# Patient Record
Sex: MALE | Race: WHITE | Employment: FULL TIME | URBAN - METROPOLITAN AREA
[De-identification: names, ages, dates, MRNs, and addresses within clinical notes are randomized per-mention and may not be internally consistent; named-entity substitution may affect disease eponyms.]

---

## 2018-07-12 ENCOUNTER — HOSPITAL ENCOUNTER (OUTPATIENT)
Dept: PHYSICAL THERAPY | Age: 58
Setting detail: THERAPIES SERIES
Discharge: HOME | End: 2018-07-12
Payer: COMMERCIAL

## 2018-07-12 PROCEDURE — G0283 ELEC STIM OTHER THAN WOUND: HCPCS

## 2018-07-12 PROCEDURE — 97162 PT EVAL MOD COMPLEX 30 MIN: CPT

## 2018-07-12 PROCEDURE — 97110 THERAPEUTIC EXERCISES: CPT

## 2018-07-12 ASSESSMENT — PAIN DESCRIPTION - PAIN TYPE: TYPE: CHRONIC PAIN

## 2018-07-12 ASSESSMENT — PAIN DESCRIPTION - PROGRESSION: CLINICAL_PROGRESSION: GRADUALLY IMPROVING

## 2018-07-12 ASSESSMENT — PAIN DESCRIPTION - ORIENTATION: ORIENTATION: RIGHT

## 2018-07-12 ASSESSMENT — PAIN DESCRIPTION - LOCATION: LOCATION: SHOULDER

## 2018-07-12 ASSESSMENT — PAIN SCALES - GENERAL: PAINLEVEL_OUTOF10: 4

## 2018-07-12 ASSESSMENT — PAIN DESCRIPTION - FREQUENCY: FREQUENCY: CONTINUOUS

## 2018-07-12 NOTE — PROGRESS NOTES
sig tightness   Observation: prominent C6-7, T1 spinous processes, sig atrophy Rt deltoid, biceps: 10 cm from antecubital fossa 24.5; Lt 26     Strength RUE  Strength RUE: Exception  R Shoulder Flexion: 3-/5  R Shoulder Extension: 3-/5  R Shoulder ABduction: 2/5  R Shoulder Internal Rotation: 3-/5  R Shoulder External Rotation: 3-/5  R Elbow Flexion: 4+/5  R Elbow Extension: 4-/5  R Forearm Pron: 4+/5  R Forearm Sup: 4+/5  R Wrist Flexion: 4+/5  R Wrist Extension: 4+/5  Strength LUE  Comment: 4+ to 5/5   Strength Other  Other: grasp WFL, dexterity WFL               PROM RUE (degrees)  RUE PROM: Exceptions  R Shoulder Flex  0-180: 110  R Shoulder ABduction 0-180: 74  R Shoulder Int Rotation  0-70: 35  R Shoulder Ext Rotation  0-90: 41  AROM RUE (degrees)  RUE AROM : Exceptions  R Shoulder Flexion 0-180: 101  R Shoulder ABduction 0-180: 70  R Shoulder Int Rotation  0-70: 30  R Shoulder Ext Rotation 0-90: 34         Sensation  Overall Sensation Status: Impaired (c/o diminished sensation Rt deltoid area )   Bed mobility  Rolling to Left: Independent  Rolling to Right: Independent  Supine to Sit: Independent  Sit to Supine: Independent     Transfers  Sit to Stand: Independent  Stand to sit: Independent     Exercises:   Exercises  Exercise 1: shldr shrugs and retraction x10 seated to improve shldr positioning   Exercise 2: pulleys 3 minutes flexion within pt tolerance to improve ROM   Exercise 3: table slides flexion only 20s/5 to improve ROM   Exercise 4: ** chin tucks   Exercise 5: ** pec str supine   Exercise 6: ** prone scap when able   Exercise 7: ** cervical isometrics   Exercise 8: ** wand exs   Exercise 9: ** pendulum if needed   Exercise 20: HEP: posture exs, table slides flexion     Manual:  Manual therapy  Joint mobilization: ** gentle Gr I, II inferior and posterior to pt tolerance only!    PROM: ** shldr all planes   Manual traction: ** cervical   Soft Tissue Mobalization: ** suboccip release, MFR, STM/ DTM to

## 2018-07-12 NOTE — PLAN OF CARE
[facility]   Kerri Crowder. [address], [place]  Phone:  [phone]   Fax:  [phone]    [] Certification  [] Recertification [x]  Plan of Care  [] Progress Note   [] Discharge        To:  Referring Practitioner: Dr. Daley Center   From:  [facility]  Patient: Deepa Bowers          : 1960  Diagnosis: cervical fracture, separation of AC joint       Date: 2018  Treatment Diagnosis: Rt shldr pain, Rt UE weakness, decreased Rt UE ROM, impaired ADLs        Progress Report Period from:   to 2018    Total # of Visits to Date: 1   No Show: 0    Canceled Appointment: 0     OBJECTIVE:   Short Term Goals - Time Frame for Short term goals: 2 wks     Goals Current/Discharge status  Met   Short term goal 1: Independent with HEP   Need for HEP, currently performing isometrics and elbow strengthening with Tband  [] yes  [] no   Short term goal 2: Report 25% reduction in symptoms with increased tolerance to ADLs   C/o pain 1-9/10, currently 4/10 at rest.  Difficulty with dressing, bathing, and most ADLs  [] yes  [] no   Short term goal 3: Improve ROM 10-15 degrees Rt shldr to allow increased ease with dressing    PROM RUE (degrees)  RUE PROM: Exceptions  R Shoulder Flex  0-180: 110  R Shoulder ABduction 0-180: 74  R Shoulder Int Rotation  0-70: 35  R Shoulder Ext Rotation  0-90: 41  AROM RUE (degrees)  RUE AROM : Exceptions  R Shoulder Flexion 0-180: 101  R Shoulder ABduction 0-180: 70  R Shoulder Int Rotation  0-70: 30  R Shoulder Ext Rotation 0-90: 34    [] yes  [] no   Short term goal 4: Improved postural awareness with decreased scapular winging to allow increased Rt shldr ROM for all ADLs   Sig rounded shoulders with Rt scapular elevation and winging impacting functional shoulder mobility  [] yes  [] no     Long Term Goals - Time Frame for Long term goals : 6-8 wks   Goals Current/ Discharge status Met   Long term goal 1: Improve Rt shldr ROM

## 2018-07-17 ENCOUNTER — HOSPITAL ENCOUNTER (OUTPATIENT)
Dept: PHYSICAL THERAPY | Age: 58
Setting detail: THERAPIES SERIES
Discharge: HOME | End: 2018-07-17
Payer: COMMERCIAL

## 2018-07-17 PROCEDURE — 97110 THERAPEUTIC EXERCISES: CPT

## 2018-07-17 PROCEDURE — G0283 ELEC STIM OTHER THAN WOUND: HCPCS

## 2018-07-17 PROCEDURE — 97140 MANUAL THERAPY 1/> REGIONS: CPT

## 2018-07-17 ASSESSMENT — PAIN DESCRIPTION - DESCRIPTORS: DESCRIPTORS: ACHING;TIGHTNESS

## 2018-07-17 ASSESSMENT — PAIN DESCRIPTION - PAIN TYPE: TYPE: CHRONIC PAIN

## 2018-07-17 ASSESSMENT — PAIN DESCRIPTION - LOCATION: LOCATION: SHOULDER

## 2018-07-17 ASSESSMENT — PAIN DESCRIPTION - ORIENTATION: ORIENTATION: RIGHT

## 2018-07-17 ASSESSMENT — PAIN SCALES - GENERAL: PAINLEVEL_OUTOF10: 2

## 2018-07-17 NOTE — PROGRESS NOTES
Physician  [x] Perform HEP  [] Meds as prescribed     ASSESSMENT:     Conditions Requiring Skilled Therapeutic Intervention  Assessment: Pt with high guard with shoulder. Pt demostrates decreased ROM in all planes     Tolerance to treatment:  [] Good   [x] Fair   [] Poor  [] Fatigued   [] Increased pain   Limited by:    Goals:     Short term goals  Time Frame for Short term goals: 2 wks   Short term goal 1: Independent with HEP   Short term goal 2: Report 25% reduction in symptoms with increased tolerance to ADLs   Short term goal 3: Improve ROM 10-15 degrees Rt shldr to allow increased ease with dressing   Short term goal 4: Improved postural awareness with decreased scapular winging to allow increased Rt shldr ROM for all ADLs   Long term goals  Time Frame for Long term goals : 6-8 wks   Long term goal 1: Improve Rt shldr ROM flexion and abduction >/= 150 to allow increased ease with fishing and overhead reaching   Long term goal 2: Improve Rt shldr strength >/= 4/5 to allow carrying objects and work duties   Long term goal 3: UEFI >/= 50/80 to demonstrate improved overall activity tolerance     Progress toward goals: TBD  Goals Met:    []  See updated POC   Comments:    PLAN:  [x] Continue POC to pt tolerance  [] Hold PT for ___ days.   See note to physician  [] Discharge PT    Signature:   Electronically signed by Tilda Gottron, PTA on 7/17/18 at 12:01 PM    PT Individual Minutes  Time In: 1000  Time Out: 1100  Minutes: 60  Timed Code Treatment Minutes: 45 Minutes

## 2018-07-18 ENCOUNTER — HOSPITAL ENCOUNTER (OUTPATIENT)
Dept: PHYSICAL THERAPY | Age: 58
Setting detail: THERAPIES SERIES
Discharge: HOME | End: 2018-07-18
Payer: COMMERCIAL

## 2018-07-18 PROCEDURE — 97140 MANUAL THERAPY 1/> REGIONS: CPT

## 2018-07-18 PROCEDURE — 97110 THERAPEUTIC EXERCISES: CPT

## 2018-07-18 PROCEDURE — G0283 ELEC STIM OTHER THAN WOUND: HCPCS

## 2018-07-18 ASSESSMENT — PAIN DESCRIPTION - LOCATION: LOCATION: SHOULDER

## 2018-07-18 ASSESSMENT — PAIN DESCRIPTION - DESCRIPTORS: DESCRIPTORS: ACHING

## 2018-07-18 ASSESSMENT — PAIN SCALES - GENERAL: PAINLEVEL_OUTOF10: 3

## 2018-07-18 ASSESSMENT — PAIN DESCRIPTION - ORIENTATION: ORIENTATION: RIGHT

## 2018-07-18 NOTE — PROGRESS NOTES
[facility]   Outpatient Physical Therapy   Treatment Note  [] [address]  [x] [facility]  Date: 2018  Patient: Claudine Rossi  : 1960  ACCT #: [de-identified]  Referring Practitioner: Dr. Terrance Beck   Diagnosis: cervical fracture, separation of [facility] joint     Visit Information:  PT Visit Information  Onset Date: 10/13/18  PT Insurance Information: [employer]   Total # of Visits Approved: 30  Total # of Visits to Date: 3  No Show: 0  Canceled Appointment: 0  Progress Note Counter: 3/16    SUBJECTIVE:   Subjective  Subjective: Reports feeling \"pretty sore\" today. HEP Compliance:  [x] Good [] Fair [] Poor [] Reports not doing due to:    PAIN   Location:   Pain Location: Shoulder (clavicle)  Pain Rating (0-10 pain scale):  Pain Level: 3  Pain Description:  Pain Descriptors: Aching  Action:  [x] Acceptable for treatment  []  Other:    OBJECTIVE:   Exercises  Exercise 1: shldr shrugs and retraction x10 seated to improve shldr positioning   Exercise 2: pulleys 3 minutes flexion within pt tolerance to improve ROM   Exercise 9: pendulums  Exercise 20: HEP: cont pendulums to decrease overall muscle tension    Manual: []  NA   Manual therapy  Soft Tissue Mobalization: seated MFR/STM cervical, sub-scap, shdlr musculature to decrease spasms and pain    Modalities: []  NA  Modalities  E-stim (parameters): IFC/HP seated x 15min to decrease pain and spasms     HEP  Continue with current Home Exercise Program.  See Objective section for progression of HEP. Comments:       POST-PAIN    Pain Rating (0-10 pain scale): 2/10  Location and Pain Description same as pre-pain unless otherwise indicated. Action: [] NA  [] Call Physician  [x] Perform HEP  [] Meds as prescribed     ASSESSMENT:     Conditions Requiring Skilled Therapeutic Intervention  Assessment: Limited to tolerance to exercises due to spasms and pt reports of \"locking up. \"

## 2018-07-23 ENCOUNTER — APPOINTMENT (OUTPATIENT)
Dept: PHYSICAL THERAPY | Age: 58
End: 2018-07-23
Payer: COMMERCIAL

## 2018-07-24 ENCOUNTER — HOSPITAL ENCOUNTER (OUTPATIENT)
Dept: PHYSICAL THERAPY | Age: 58
Setting detail: THERAPIES SERIES
Discharge: HOME | End: 2018-07-24
Payer: COMMERCIAL

## 2018-07-24 PROCEDURE — 97140 MANUAL THERAPY 1/> REGIONS: CPT

## 2018-07-24 PROCEDURE — 97110 THERAPEUTIC EXERCISES: CPT

## 2018-07-24 PROCEDURE — G0283 ELEC STIM OTHER THAN WOUND: HCPCS

## 2018-07-24 ASSESSMENT — PAIN SCALES - GENERAL: PAINLEVEL_OUTOF10: 2

## 2018-07-24 ASSESSMENT — PAIN DESCRIPTION - ORIENTATION: ORIENTATION: RIGHT

## 2018-07-24 ASSESSMENT — PAIN DESCRIPTION - LOCATION: LOCATION: SHOULDER

## 2018-07-24 ASSESSMENT — PAIN DESCRIPTION - DESCRIPTORS: DESCRIPTORS: ACHING

## 2018-07-24 ASSESSMENT — PAIN DESCRIPTION - PAIN TYPE: TYPE: CHRONIC PAIN

## 2018-07-25 ENCOUNTER — APPOINTMENT (OUTPATIENT)
Dept: PHYSICAL THERAPY | Age: 58
End: 2018-07-25
Payer: COMMERCIAL

## 2018-07-26 ENCOUNTER — APPOINTMENT (OUTPATIENT)
Dept: PHYSICAL THERAPY | Age: 58
End: 2018-07-26
Payer: COMMERCIAL

## 2018-07-30 ENCOUNTER — HOSPITAL ENCOUNTER (OUTPATIENT)
Dept: PHYSICAL THERAPY | Age: 58
Setting detail: THERAPIES SERIES
Discharge: HOME | End: 2018-07-30
Payer: COMMERCIAL

## 2018-07-30 PROCEDURE — 97110 THERAPEUTIC EXERCISES: CPT

## 2018-07-30 PROCEDURE — 97140 MANUAL THERAPY 1/> REGIONS: CPT

## 2018-07-30 PROCEDURE — G0283 ELEC STIM OTHER THAN WOUND: HCPCS

## 2018-07-30 ASSESSMENT — PAIN SCALES - GENERAL: PAINLEVEL_OUTOF10: 2

## 2018-07-30 ASSESSMENT — PAIN DESCRIPTION - PAIN TYPE: TYPE: CHRONIC PAIN

## 2018-07-30 ASSESSMENT — PAIN DESCRIPTION - ORIENTATION: ORIENTATION: RIGHT

## 2018-07-30 ASSESSMENT — PAIN DESCRIPTION - LOCATION: LOCATION: SHOULDER

## 2018-07-30 ASSESSMENT — PAIN DESCRIPTION - DESCRIPTORS: DESCRIPTORS: ACHING

## 2018-07-30 NOTE — PROGRESS NOTES
POST-PAIN    Pain Rating (0-10 pain scale): 3/10  Location and Pain Description same as pre-pain unless otherwise indicated. Action: [] NA  [] Call Physician  [x] Perform HEP  [] Meds as prescribed     ASSESSMENT:     Conditions Requiring Skilled Therapeutic Intervention  Assessment: pt with increased pain with rom and abduction. Pt demonstrated increased prom. Tolerance to treatment:  [x] Good   [] Fair   [] Poor  [] Fatigued   [] Increased pain   Limited by:    Goals:     Short term goals  Time Frame for Short term goals: 2 wks   Short term goal 1: Independent with HEP   Short term goal 2: Report 25% reduction in symptoms with increased tolerance to ADLs   Short term goal 3: Improve ROM 10-15 degrees Rt shldr to allow increased ease with dressing   Short term goal 4: Improved postural awareness with decreased scapular winging to allow increased Rt shldr ROM for all ADLs   Long term goals  Time Frame for Long term goals : 6-8 wks   Long term goal 1: Improve Rt shldr ROM flexion and abduction >/= 150 to allow increased ease with fishing and overhead reaching   Long term goal 2: Improve Rt shldr strength >/= 4/5 to allow carrying objects and work duties   Long term goal 3: UEFI >/= 50/80 to demonstrate improved overall activity tolerance     Progress toward goals:  rom  Goals Met:    []  See updated POC   Comments:    PLAN:  [x] Continue POC to pt tolerance  [] Hold PT for ___ days.   See note to physician  [] Discharge PT    Signature:   Electronically signed by Tanja Gross PTA on 7/30/18 at 3:20 PM    PT Individual Minutes  Time In: 1300  Time Out: 1400  Minutes: 60  Timed Code Treatment Minutes: 50 Minutes

## 2018-08-01 ENCOUNTER — HOSPITAL ENCOUNTER (OUTPATIENT)
Dept: PHYSICAL THERAPY | Age: 58
Setting detail: THERAPIES SERIES
Discharge: HOME | End: 2018-08-01
Payer: COMMERCIAL

## 2018-08-01 PROCEDURE — 97140 MANUAL THERAPY 1/> REGIONS: CPT

## 2018-08-01 PROCEDURE — G0283 ELEC STIM OTHER THAN WOUND: HCPCS

## 2018-08-01 PROCEDURE — 97110 THERAPEUTIC EXERCISES: CPT

## 2018-08-01 ASSESSMENT — PAIN DESCRIPTION - DESCRIPTORS: DESCRIPTORS: ACHING

## 2018-08-01 ASSESSMENT — PAIN SCALES - GENERAL: PAINLEVEL_OUTOF10: 3

## 2018-08-01 ASSESSMENT — PAIN DESCRIPTION - ORIENTATION: ORIENTATION: RIGHT

## 2018-08-01 ASSESSMENT — PAIN DESCRIPTION - LOCATION: LOCATION: SHOULDER

## 2018-08-01 NOTE — PROGRESS NOTES
Perform HEP  [] Meds as prescribed     ASSESSMENT:     Conditions Requiring Skilled Therapeutic Intervention  Assessment: Large muscle spasms throught scap musculature which pt reports \"locked up\" during exercises and reported unable to move. Utlized heat during prone arm hang for increase elasticity and tolerance to treatment. Pt with good release and increase scap mobility. Patient Education: wear and removal protocol with KT     Tolerance to treatment:  [x] Good   [] Fair   [] Poor  [] Fatigued   [] Increased pain   Limited by:    Goals:     Short term goals  Time Frame for Short term goals: 2 wks   Short term goal 1: Independent with HEP   Short term goal 2: Report 25% reduction in symptoms with increased tolerance to ADLs   Short term goal 3: Improve ROM 10-15 degrees Rt shldr to allow increased ease with dressing   Short term goal 4: Improved postural awareness with decreased scapular winging to allow increased Rt shldr ROM for all ADLs   Long term goals  Time Frame for Long term goals : 6-8 wks   Long term goal 1: Improve Rt shldr ROM flexion and abduction >/= 150 to allow increased ease with fishing and overhead reaching   Long term goal 2: Improve Rt shldr strength >/= 4/5 to allow carrying objects and work duties   Long term goal 3: UEFI >/= 50/80 to demonstrate improved overall activity tolerance     Progress toward goals: good pain reduction and improved scapular positioning with treatment  Goals Met:    []  See updated POC   Comments:    PLAN:  [x] Continue POC to pt tolerance  [] Hold PT for ___ days.   See note to physician  [] Discharge PT    Signature:   Electronically signed by Dez Mcbride PTA on 8/1/18 at 10:09 AM    PT Individual Minutes  Time In: 1000  Time Out: 1100  Minutes: 60  Timed Code Treatment Minutes: 45 Minutes

## 2018-08-06 ENCOUNTER — HOSPITAL ENCOUNTER (OUTPATIENT)
Dept: PHYSICAL THERAPY | Age: 58
Setting detail: THERAPIES SERIES
Discharge: HOME | End: 2018-08-06
Payer: COMMERCIAL

## 2018-08-06 PROCEDURE — 97140 MANUAL THERAPY 1/> REGIONS: CPT

## 2018-08-06 PROCEDURE — G0283 ELEC STIM OTHER THAN WOUND: HCPCS

## 2018-08-06 PROCEDURE — 97110 THERAPEUTIC EXERCISES: CPT

## 2018-08-06 ASSESSMENT — PAIN DESCRIPTION - LOCATION: LOCATION: SHOULDER

## 2018-08-06 ASSESSMENT — PAIN DESCRIPTION - ORIENTATION: ORIENTATION: RIGHT

## 2018-08-06 ASSESSMENT — PAIN DESCRIPTION - PROGRESSION: CLINICAL_PROGRESSION: GRADUALLY IMPROVING

## 2018-08-06 ASSESSMENT — PAIN DESCRIPTION - FREQUENCY: FREQUENCY: CONTINUOUS

## 2018-08-06 ASSESSMENT — PAIN SCALES - GENERAL: PAINLEVEL_OUTOF10: 3

## 2018-08-06 ASSESSMENT — PAIN DESCRIPTION - DESCRIPTORS: DESCRIPTORS: ACHING;SPASM

## 2018-08-06 NOTE — PROGRESS NOTES
unless otherwise indicated. Action: [] NA  [] Call Physician  [x] Perform HEP  [] Meds as prescribed     ASSESSMENT:     Conditions Requiring Skilled Therapeutic Intervention  Assessment: Crepitus noted in left scap and shoulder with palpation. Muscle spasms noted with end range with all movements     Tolerance to treatment:  [x] Good   [] Fair   [] Poor  [] Fatigued   [] Increased pain   Limited by:    Goals:     Short term goals  Time Frame for Short term goals: 2 wks   Short term goal 1: Independent with HEP   Short term goal 2: Report 25% reduction in symptoms with increased tolerance to ADLs   Short term goal 3: Improve ROM 10-15 degrees Rt shldr to allow increased ease with dressing   Short term goal 4: Improved postural awareness with decreased scapular winging to allow increased Rt shldr ROM for all ADLs   Long term goals  Time Frame for Long term goals : 6-8 wks   Long term goal 1: Improve Rt shldr ROM flexion and abduction >/= 150 to allow increased ease with fishing and overhead reaching   Long term goal 2: Improve Rt shldr strength >/= 4/5 to allow carrying objects and work duties   Long term goal 3: UEFI >/= 50/80 to demonstrate improved overall activity tolerance     Progress toward goals:  Goals Met:    []  See updated POC   Comments:    PLAN:  [x] Continue POC to pt tolerance  [] Hold PT for ___ days.   See note to physician  [] Discharge PT    Signature:   Electronically signed by Renny Molina PTA on 8/6/18 at 10:07 AM    PT Individual Minutes  Time In: 1000  Time Out: 1100  Minutes: 60   Activity Minutes Units   Ther Ex 25 2   Manual   20  1   Neuro Ed     EStim 15 1       Timed Code Treatment Minutes: 45 Minutes

## 2018-08-14 ENCOUNTER — HOSPITAL ENCOUNTER (OUTPATIENT)
Dept: PHYSICAL THERAPY | Age: 58
Setting detail: THERAPIES SERIES
Discharge: HOME | End: 2018-08-14
Payer: COMMERCIAL

## 2018-08-14 PROCEDURE — 97140 MANUAL THERAPY 1/> REGIONS: CPT

## 2018-08-14 PROCEDURE — 97110 THERAPEUTIC EXERCISES: CPT

## 2018-08-14 PROCEDURE — 97035 APP MDLTY 1+ULTRASOUND EA 15: CPT

## 2018-08-14 ASSESSMENT — PAIN DESCRIPTION - LOCATION: LOCATION: SHOULDER

## 2018-08-14 ASSESSMENT — PAIN DESCRIPTION - DESCRIPTORS: DESCRIPTORS: ACHING

## 2018-08-14 ASSESSMENT — PAIN SCALES - GENERAL: PAINLEVEL_OUTOF10: 3

## 2018-08-14 ASSESSMENT — PAIN DESCRIPTION - ORIENTATION: ORIENTATION: RIGHT

## 2018-08-14 NOTE — PROGRESS NOTES
otherwise indicated. Action: [] NA  [] Call Physician  [x] Perform HEP  [] Meds as prescribed     ASSESSMENT:     Conditions Requiring Skilled Therapeutic Intervention  Assessment: Pt with increasing chong to treatment. Pt stated  us was uncomfortable when applied to anterior shoulder but felt ok on deltoid insertion, may decrease intensity nv. Tolerance to treatment:  [x] Good   [] Fair   [] Poor  [] Fatigued   [] Increased pain   Limited by:    Goals:     Short term goals  Time Frame for Short term goals: 2 wks   Short term goal 1: Independent with HEP   Short term goal 2: Report 25% reduction in symptoms with increased tolerance to ADLs   Short term goal 3: Improve ROM 10-15 degrees Rt shldr to allow increased ease with dressing   Short term goal 4: Improved postural awareness with decreased scapular winging to allow increased Rt shldr ROM for all ADLs   Long term goals  Time Frame for Long term goals : 6-8 wks   Long term goal 1: Improve Rt shldr ROM flexion and abduction >/= 150 to allow increased ease with fishing and overhead reaching   Long term goal 2: Improve Rt shldr strength >/= 4/5 to allow carrying objects and work duties   Long term goal 3: UEFI >/= 50/80 to demonstrate improved overall activity tolerance     Progress toward goals: slow progress due to pain   Goals Met:    []  See updated POC   Comments:    PLAN:  [x] Continue POC to pt tolerance  [] Hold PT for ___ days.   See note to physician  [] Discharge PT    Signature:   Electronically signed by Cassidy Anthony PTA on 8/14/18 at 10:25 AM    PT Individual Minutes  Time In: 0900  Time Out: 4393  Minutes: 45  Timed Code Treatment Minutes: 45 Minutes

## 2018-08-21 ENCOUNTER — HOSPITAL ENCOUNTER (OUTPATIENT)
Dept: PHYSICAL THERAPY | Age: 58
Setting detail: THERAPIES SERIES
Discharge: HOME | End: 2018-08-21
Payer: COMMERCIAL

## 2018-08-21 NOTE — PROGRESS NOTES
[address] of [address] [employer]      Physical Therapy  Cancellation/No-show Note          Patient Name:  Makayla Cavazos  :  1960   Date:  2018  Referring Practitioner: Dr. David Oedn   Diagnosis: cervical fracture, separation of [facility] joint     Visit Information:  PT Visit Information  Onset Date: 10/13/18  [facility] Information[facility]   Total # of Visits Approved: 30  No Show: 1  Canceled Appointment: 1  Progress Note Counter: nc/ns    For today's appointment patient:  []  Cancelled  []  Rescheduled appointment  [x]  No-show   [x]  Called pt no voicemail set up.      Reason given by patient:  []  Patient ill  []  Conflicting appointment  []  No transportation    []  Conflict with work  []  Weather  []  No reason given   []  Other:       Comments:       Signature: Electronically signed by Oksana Magallanes PTA on 18 at 9:27 AM

## 2018-08-23 ENCOUNTER — HOSPITAL ENCOUNTER (OUTPATIENT)
Dept: PHYSICAL THERAPY | Age: 58
Setting detail: THERAPIES SERIES
Discharge: HOME | End: 2018-08-23
Payer: COMMERCIAL

## 2018-08-23 PROCEDURE — 97110 THERAPEUTIC EXERCISES: CPT

## 2018-08-23 PROCEDURE — 97140 MANUAL THERAPY 1/> REGIONS: CPT

## 2018-08-23 PROCEDURE — G0283 ELEC STIM OTHER THAN WOUND: HCPCS

## 2018-08-23 ASSESSMENT — PAIN DESCRIPTION - LOCATION: LOCATION: SHOULDER;ARM;NECK

## 2018-08-23 ASSESSMENT — PAIN SCALES - GENERAL: PAINLEVEL_OUTOF10: 2

## 2018-08-23 ASSESSMENT — PAIN DESCRIPTION - ORIENTATION: ORIENTATION: RIGHT

## 2018-08-23 ASSESSMENT — PAIN DESCRIPTION - DESCRIPTORS: DESCRIPTORS: ACHING

## 2018-08-28 ENCOUNTER — HOSPITAL ENCOUNTER (OUTPATIENT)
Dept: PHYSICAL THERAPY | Age: 58
Setting detail: THERAPIES SERIES
Discharge: HOME | End: 2018-08-28
Payer: COMMERCIAL

## 2018-08-28 PROCEDURE — 97110 THERAPEUTIC EXERCISES: CPT

## 2018-08-28 PROCEDURE — 97140 MANUAL THERAPY 1/> REGIONS: CPT

## 2018-08-28 PROCEDURE — G0283 ELEC STIM OTHER THAN WOUND: HCPCS

## 2018-08-28 ASSESSMENT — PAIN DESCRIPTION - LOCATION: LOCATION: SHOULDER

## 2018-08-28 ASSESSMENT — PAIN DESCRIPTION - DESCRIPTORS: DESCRIPTORS: ACHING

## 2018-08-28 ASSESSMENT — PAIN DESCRIPTION - ORIENTATION: ORIENTATION: RIGHT

## 2018-08-28 ASSESSMENT — PAIN SCALES - GENERAL: PAINLEVEL_OUTOF10: 2

## 2018-08-28 NOTE — PROGRESS NOTES
for progression of HEP. Comments:       POST-PAIN    Pain Rating (0-10 pain scale): 1-2/10   Location and Pain Description same as pre-pain unless otherwise indicated. Action: [] NA  [] Call Physician  [x] Perform HEP  [] Meds as prescribed     ASSESSMENT:     Conditions Requiring Skilled Therapeutic Intervention  Assessment: Pt with improved AROM and PROM R shoulder flexion vs evaluation. Without significant change in all other planes with pain at all end ranges. Pt reports most relief with modalities. Tolerance to treatment:  [x] Good   [] Fair   [] Poor  [] Fatigued   [] Increased pain   Limited by:    Goals:     Short term goals  Time Frame for Short term goals: 2 wks   Short term goal 1: Independent with HEP   Short term goal 2: Report 25% reduction in symptoms with increased tolerance to ADLs   Short term goal 3: Improve ROM 10-15 degrees Rt shldr to allow increased ease with dressing   Short term goal 4: Improved postural awareness with decreased scapular winging to allow increased Rt shldr ROM for all ADLs   Long term goals  Time Frame for Long term goals : 6-8 wks   Long term goal 1: Improve Rt shldr ROM flexion and abduction >/= 150 to allow increased ease with fishing and overhead reaching   Long term goal 2: Improve Rt shldr strength >/= 4/5 to allow carrying objects and work duties   Long term goal 3: UEFI >/= 50/80 to demonstrate improved overall activity tolerance     Progress toward goals: Therex, manual, and modalities to improve UE ROM and decrease muscle tension for improved tolerance to functional activities  Goals Met:    []  See updated POC   Comments:    PLAN:  [x] Continue POC to pt tolerance  [] Hold PT for ___ days.   See note to physician  [] Discharge PT    Signature:   Electronically signed by Ada Abraham PTA on 8/28/18 at 10:57 AM    PT Individual Minutes  Time In: 5088  Time Out: 9488  Minutes: 55  Timed Code Treatment Minutes: 45 Minutes        Activity Minutes Units

## 2018-09-04 ENCOUNTER — HOSPITAL ENCOUNTER (OUTPATIENT)
Dept: PHYSICAL THERAPY | Age: 58
Setting detail: THERAPIES SERIES
Discharge: HOME | End: 2018-09-04
Payer: COMMERCIAL

## 2018-09-04 PROCEDURE — 97110 THERAPEUTIC EXERCISES: CPT

## 2018-09-04 PROCEDURE — G0283 ELEC STIM OTHER THAN WOUND: HCPCS

## 2018-09-04 PROCEDURE — 97140 MANUAL THERAPY 1/> REGIONS: CPT

## 2018-09-04 ASSESSMENT — PAIN SCALES - GENERAL: PAINLEVEL_OUTOF10: 2

## 2018-09-04 ASSESSMENT — PAIN DESCRIPTION - LOCATION: LOCATION: SHOULDER

## 2018-09-04 ASSESSMENT — PAIN DESCRIPTION - ORIENTATION: ORIENTATION: RIGHT

## 2018-09-04 ASSESSMENT — PAIN DESCRIPTION - DESCRIPTORS: DESCRIPTORS: ACHING

## 2018-09-04 NOTE — PROGRESS NOTES
[facility]   Outpatient Physical Therapy   Treatment Note  [] [address]  [x] [facility]            of [address]  Date: 2018  Patient: Sussy Marinelli  : 1960  ACCT #: [de-identified]  Referring Practitioner: Dr. Matthew Nava   Diagnosis: cervical fracture, separation of [facility] joint     Visit Information:  PT Visit Information  Onset Date: 10/13/18  PT Insurance Information: [employer]   Total # of Visits Approved: 30  Total # of Visits to Date:   No Show: 1  Canceled Appointment: 1  Progress Note Counter:     SUBJECTIVE:   Subjective  Subjective: Pt reports exercises are going ok at home. Reports has \"good days and bad days. \"  HEP Compliance:  [x] Good [] Fair [] Poor [] Reports not doing due to:    PAIN   Location:   Pain Location: Shoulder  Pain Rating (0-10 pain scale):  Pain Level: 2  Pain Description:  Pain Descriptors: Aching  Action:  [x] Acceptable for treatment  []  Other:    OBJECTIVE:   Exercises  Exercise 1: posture ex 15x   Exercise 2: pulleys 4 minutes flexion scaption  within pt tolerance to improve ROM   Exercise 3: initiated wall slides 10x  Exercise 6: initiated prone scap 2 way x 10 (unable to chong abd)  Exercise 20: HEP: cont current    Manual: []  NA   Manual therapy  PROM:  shoulder PROM all planes    Modalities: []  NA  Modalities  E-stim (parameters): IFC/HP seated x10 min to decrease pain and inflammation     Strength: [] NT   Strength RUE  R Shoulder Flexion: 3-/5  R Shoulder ABduction: 2+/5  R Shoulder Internal Rotation: 3-/5  R Shoulder External Rotation: 3-/5  R Elbow Flexion: 4+/5  R Elbow Extension: 4/5    ROM: [] NT  PROM RUE (degrees)  R Shoulder Flex  0-180: 130  R Shoulder ABduction 0-180: 120  R Shoulder Int Rotation  0-70: 30  R Shoulder Ext Rotation  0-90: 33    HEP  Continue with current Home Exercise Program.  See Objective section for progression of HEP.       Comments:       POST-PAIN    Pain Rating (0-10 pain scale): 2/10  Location and Pain Description same as pre-pain unless otherwise indicated. Action: [] NA  [] Call Physician  [x] Perform HEP  [] Meds as prescribed     ASSESSMENT:     Conditions Requiring Skilled Therapeutic Intervention  Assessment: Increase flex and abduction with PROM. Able to tolerate prone scap exercises, however noted scapular musculature fatigue with ex. Tolerance to treatment:  [x] Good   [] Fair   [] Poor  [] Fatigued   [] Increased pain   Limited by:    Goals:     Short term goals  Time Frame for Short term goals: 2 wks   Short term goal 1: Independent with HEP   Short term goal 2: Report 25% reduction in symptoms with increased tolerance to ADLs   Short term goal 3: Improve ROM 10-15 degrees Rt shldr to allow increased ease with dressing   Short term goal 4: Improved postural awareness with decreased scapular winging to allow increased Rt shldr ROM for all ADLs   Long term goals  Time Frame for Long term goals : 6-8 wks   Long term goal 1: Improve Rt shldr ROM flexion and abduction >/= 150 to allow increased ease with fishing and overhead reaching   Long term goal 2: Improve Rt shldr strength >/= 4/5 to allow carrying objects and work duties   Long term goal 3: UEFI >/= 50/80 to demonstrate improved overall activity tolerance     Progress toward goals: ROM  Goals Met:    []  See updated POC   Comments:    PLAN:  [x] Continue POC to pt tolerance  [] Hold PT for ___ days.   See note to physician  [] Discharge PT    Signature:   Electronically signed by Edy Guillory PTA on 9/4/18 at 10:16 AM    PT Individual Minutes  Time In: 1000  Time Out: 9187  Minutes: 113  Timed Code Treatment Minutes: 38 Minutes

## 2018-09-06 ENCOUNTER — HOSPITAL ENCOUNTER (OUTPATIENT)
Dept: PHYSICAL THERAPY | Age: 58
Setting detail: THERAPIES SERIES
Discharge: HOME | End: 2018-09-06
Payer: COMMERCIAL

## 2018-09-06 NOTE — PROGRESS NOTES
[facility] [facility]    [] [address]  [x] [facility]         of [address]     Physical Therapy  Cancellation/No-show Note  Patient Name:  Laura Granger  :  1960   Date:  2018  Referring Practitioner: Dr. Danielle Mak   Diagnosis: cervical fracture, separation of [facility] [place] joint     Visit Information:  PT Visit Information  Onset Date: 10/13/18  [facility]   Total # of Visits Approved: 30  Total # of Visits to Date:   No Show: 1  Canceled Appointment: 2  Progress Note Counter:  cxl    For today's appointment patient:  [x]  Cancelled  []  Rescheduled appointment  []  No-show   []  Called pt to remind of next appointment     Reason given by patient:  []  Patient ill  []  Conflicting appointment  []  No transportation    []  Conflict with work  []  No reason given  []  Inclement weather   []  Other:       Comments:   Son home from school ill    Signature: Electronically signed by Jacqueline Manzanares PT on 18 at 7:53 AM

## 2018-09-10 ENCOUNTER — HOSPITAL ENCOUNTER (OUTPATIENT)
Dept: PHYSICAL THERAPY | Age: 58
Setting detail: THERAPIES SERIES
Discharge: HOME | End: 2018-09-10
Payer: COMMERCIAL

## 2018-09-10 PROCEDURE — 97140 MANUAL THERAPY 1/> REGIONS: CPT

## 2018-09-10 PROCEDURE — 97110 THERAPEUTIC EXERCISES: CPT

## 2018-09-10 ASSESSMENT — PAIN DESCRIPTION - ORIENTATION: ORIENTATION: RIGHT

## 2018-09-10 ASSESSMENT — PAIN SCALES - GENERAL: PAINLEVEL_OUTOF10: 1

## 2018-09-10 ASSESSMENT — PAIN DESCRIPTION - DESCRIPTORS: DESCRIPTORS: ACHING

## 2018-09-10 ASSESSMENT — PAIN DESCRIPTION - LOCATION: LOCATION: NECK;ARM

## 2018-09-10 NOTE — PROGRESS NOTES
[] Meds as prescribed     ASSESSMENT:     Conditions Requiring Skilled Therapeutic Intervention  Body structures, Functions, Activity limitations: Decreased ADL status, Decreased ROM, Decreased strength, Decreased sensation, Decreased high-level IADLs  Assessment: Pt with improving overall ROM and function with decreased pain. Pt reports continued difficulty with overhead activites and reaching to side. Pt with continued ROM deficits and weakness affecting ADLs and function. pt would benefit from continued skilled PT to address deficits and maximize function and independence with decreased pain. Pt improving well toward all LTGs and has met all STGs. Treatment Diagnosis: Rt shldr pain, Rt UE weakness, decreased Rt UE ROM, impaired ADLs   Exam: uefi 52/80     Tolerance to treatment:  [x] Good   [] Fair   [] Poor  [] Fatigued   [] Increased pain   Limited by:    Goals:     Short term goals  Time Frame for Short term goals: 2 wks   Short term goal 1: Independent with HEP   Short term goal 2: Report 25% reduction in symptoms with increased tolerance to ADLs   Short term goal 3: Improve ROM 10-15 degrees Rt shldr to allow increased ease with dressing   Short term goal 4: Improved postural awareness with decreased scapular winging to allow increased Rt shldr ROM for all ADLs   Long term goals  Time Frame for Long term goals : 6-8 wks   Long term goal 1: Improve Rt shldr ROM flexion and abduction >/= 150 to allow increased ease with fishing and overhead reaching   Long term goal 2: Improve Rt shldr strength >/= 4/5 to allow carrying objects and work duties   Long term goal 3: UEFI >/= 60/80 to demonstrate improved overall activity tolerance     Progress toward goals:  Goals Met:    [x]  See updated POC   Comments:    PLAN:  [x] Continue POC to pt tolerance pt on vacation next week  [] Hold PT for ___ days.   See note to physician  [] Discharge PT    Signature:   Electronically signed by Ema Sandoval PTA on 9/10/18

## 2018-09-10 NOTE — PLAN OF CARE
[address] [facility]   Kerri Ponce [address] [place], [place]  Phone:  [phone]   Fax:  [phone]    [] Certification  [] Recertification [x]  Plan of Care  [] Progress Note   [] Discharge        To:  Referring Practitioner: Dr. Foote Form   From:  Sekou Love PT  Patient: Jackson Orellana          : 1960  Diagnosis: cervical fracture, separation of AC joint       Date: 9/10/2018  Treatment Diagnosis: Rt shldr pain, Rt UE weakness, decreased Rt UE ROM, impaired ADLs        Progress Report Period from:   to 9/10/2018    Total # of Visits to Date: 12   No Show: 1    Canceled Appointment: 2     OBJECTIVE:   Short Term Goals - Time Frame for Short term goals: 2 wks     Goals Current/Discharge status  Met   Short term goal 1: Independent with HEP   Independent with given HEP, needs progressed as tolerated  [x] yes  [] no   Short term goal 2: Report 25% reduction in symptoms with increased tolerance to ADLs   Reports 50% improvement with increased ease with washing hair, dressing; continued difficulty with reaching, driving, turning on lights  [x] yes  [] no   Short term goal 3: Improve ROM 10-15 degrees Rt shldr to allow increased ease with dressing   PROM RUE (degrees)  R Shoulder Flex  0-180: 130  R Shoulder ABduction 0-180: 120  R Shoulder Int Rotation  0-70: 30  R Shoulder Ext Rotation  0-90: 33   With increased guarding with rotation    AROM RUE (degrees)  R Shoulder Flexion 0-180: 122 supine   R Shoulder ABduction 0-180: 100 supine   R Shoulder Int Rotation  0-70: 40 (with 40 degrees of abduction)  R Shoulder Ext Rotation 0-90: 45 (with 40 degrees abduction)    [x] yes  [x] no   Short term goal 4: Improved postural awareness with decreased scapular winging to allow increased Rt shldr ROM for all ADLs   Pt with improved postural awareness with improving ROM, especially fwd flexion with decreasing compensation, continued VCs for upright  [x] yes  [x] no     Long Term Goals - Time Frame for Long term goals : 6-8 wks   Goals Current/ Discharge status Met   Long term goal 1: Improve Rt shldr ROM flexion and abduction >/= 150 to allow increased ease with fishing and overhead reaching  As above  [] yes  [x] no   Long term goal 2: Improve Rt shldr strength >/= 4/5 to allow carrying objects and work duties  R Shoulder Flexion: 3-/5  R Shoulder ABduction: 2+/5  R Shoulder Internal Rotation: 3-/5  R Shoulder External Rotation: 3-/5  R Elbow Flexion: 4+/5  R Elbow Extension: 4/5 [] yes  [x] no   Long term goal 3: UEFI >/= 50/80 to demonstrate improved overall activity tolerance    New goal: >/= 60/80  UEFI: 52/80 [x] yes  [] no     Body structures, Functions, Activity limitations: Decreased ADL status, Decreased ROM, Decreased strength, Decreased sensation, Decreased high-level IADLs  Assessment: Pt with improving overall ROM and function with decreased pain. Pt reports continued difficulty with overhead activites and reaching to side. Pt with continued ROM deficits and weakness affecting ADLs and function. pt would benefit from continued skilled PT to address deficits and maximize function and independence with decreased pain. Pt improving well toward all LTGs and has met all STGs. New Education Provided:  HEP, POC  G-Codes   NA    PLAN: [x] Evaluate and Treat  Frequency/Duration:  Plan  Times per week: 2  Plan weeks: 6  Current Treatment Recommendations: Strengthening, ROM, ADL/Self-care Training, IADL Training, Manual Therapy - Soft Tissue Mobilization, Manual Therapy - Joint Manipulation, Pain Management, Home Exercise Program, Safety Education & Training, Patient/Caregiver Education & Training, Equipment Evaluation, Education, & procurement, Modalities     Precautions: surgery         ? Patient Status:[x] Continue/ Initate plan of Care    [] Discharge PT. Recommend pt continue with HEP.      [x] Additional visits requested, Please re-certify for additional

## 2018-09-13 ENCOUNTER — APPOINTMENT (OUTPATIENT)
Dept: PHYSICAL THERAPY | Age: 58
End: 2018-09-13
Payer: COMMERCIAL

## 2018-09-25 ENCOUNTER — HOSPITAL ENCOUNTER (OUTPATIENT)
Dept: PHYSICAL THERAPY | Age: 58
Setting detail: THERAPIES SERIES
Discharge: HOME | End: 2018-09-25
Payer: COMMERCIAL

## 2018-09-25 PROCEDURE — 97140 MANUAL THERAPY 1/> REGIONS: CPT

## 2018-09-25 PROCEDURE — G0283 ELEC STIM OTHER THAN WOUND: HCPCS

## 2018-09-25 PROCEDURE — 97110 THERAPEUTIC EXERCISES: CPT

## 2018-09-25 ASSESSMENT — PAIN DESCRIPTION - ORIENTATION: ORIENTATION: RIGHT

## 2018-09-25 ASSESSMENT — PAIN DESCRIPTION - DESCRIPTORS: DESCRIPTORS: TIGHTNESS

## 2018-09-25 ASSESSMENT — PAIN SCALES - GENERAL: PAINLEVEL_OUTOF10: 2

## 2018-09-25 ASSESSMENT — PAIN DESCRIPTION - LOCATION: LOCATION: ARM

## 2018-09-25 NOTE — PROGRESS NOTES
[facility]   Outpatient Physical Therapy   Treatment Note  [] [address]  [x] [facility]            of [address]  Date: 2018  Patient: Fidelina Romo  : 1960  ACCT #: [de-identified]  Referring Practitioner: Dr. Muñoz Old   Diagnosis: cervical fracture, separation of [facility] joint     Visit Information:  PT Visit Information  Onset Date: 10/13/18  PT Insurance Information: [employer]   Total # of Visits Approved: 30  Total # of Visits to Date:   No Show: 1  Canceled Appointment: 2  Progress Note Counter:     SUBJECTIVE:   Subjective  Subjective: pt reports that he was playing pool this weekend with increased pain  HEP Compliance:  [x] Good [] Fair [] Poor [] Reports not doing due to:    PAIN   Location:   Pain Location: Arm  Pain Rating (0-10 pain scale):  Pain Level: 2  Pain Description:  Pain Descriptors: Tightness  Action:  [x] Acceptable for treatment  []  Other:    OBJECTIVE:   Exercises  Exercise 1: posture ex 15x   Exercise 2: pulleys 4 minutes flexion scaption  within pt tolerance to improve ROM   Exercise 3: wall slides 10x flexion abduction and horizontal abduction   Exercise 6:  prone scap 3 way x 15, abduction x10  Exercise 10: initiated ytb rows and lats x10 to increase strength   Exercise 20: HEP cont current    Manual: []  NA   Manual therapy  Joint mobilization: gr I joint mobs   PROM:  shoulder PROM all planes    Modalities: []  NA  Modalities  E-stim (parameters): IFC/HP seated x10 min to decrease pain and inflammation     Mobility: [x]  NA     Strength: [x] NT     ROM: [x] NT        HEP  Continue with current Home Exercise Program.  See Objective section for progression of HEP. Comments:       POST-PAIN    Pain Rating (0-10 pain scale): 2/10  Location and Pain Description same as pre-pain unless otherwise indicated.   Action: [] NA  [] Call Physician  [x] Perform HEP  [] Meds as prescribed     ASSESSMENT:     Conditions Requiring Skilled Therapeutic Intervention  Assessment: pt with increasing to to exercises. Pt with cont pain with abduction. Tolerance to treatment:  [x] Good   [] Fair   [] Poor  [] Fatigued   [] Increased pain   Limited by:    Goals:     Short term goals  Time Frame for Short term goals: 2 wks   Short term goal 1: Independent with HEP   Short term goal 2: Report 25% reduction in symptoms with increased tolerance to ADLs   Short term goal 3: Improve ROM 10-15 degrees Rt shldr to allow increased ease with dressing   Short term goal 4: Improved postural awareness with decreased scapular winging to allow increased Rt shldr ROM for all ADLs   Long term goals  Time Frame for Long term goals : 6-8 wks   Long term goal 1: Improve Rt shldr ROM flexion and abduction >/= 150 to allow increased ease with fishing and overhead reaching   Long term goal 2: Improve Rt shldr strength >/= 4/5 to allow carrying objects and work duties   Long term goal 3: UEFI >/= 60/80 to demonstrate improved overall activity tolerance     Progress toward goals: rom  Goals Met:    []  See updated POC   Comments:    PLAN:  [x] Continue POC to pt tolerance  [] Hold PT for ___ days.   See note to physician  [] Discharge PT    Signature:   Electronically signed by Heydi Sam PTA on 9/25/18 at 11:00 AM    PT Individual Minutes  Time In: 1000  Time Out: 6485  Minutes: 50  Timed Code Treatment Minutes: 40 Minutes

## 2018-09-27 ENCOUNTER — HOSPITAL ENCOUNTER (OUTPATIENT)
Dept: PHYSICAL THERAPY | Age: 58
Setting detail: THERAPIES SERIES
Discharge: HOME | End: 2018-09-27
Payer: COMMERCIAL

## 2018-09-27 NOTE — PROGRESS NOTES
[facility]     Physical Therapy  Cancellation/No-show Note  Patient Name:  Ritu Acosta  :  1960   Date:  2018  Referring Practitioner: Dr. Jazz Dangelo   Diagnosis: cervical fracture, separation of [facility] joint     Visit Information:  PT Visit Information  Onset Date: 10/13/18  PT Insurance Information: [employer]   Total # of Visits Approved: 30  Total # of Visits to Date:   No Show: 1  Canceled Appointment: 3  Progress Note Counter:  Cx 18    For today's appointment patient:  [x]  Cancelled  []  Rescheduled appointment  []  No-show   []  Called pt to remind of next appointment     Reason given by patient:  []  Patient ill  []  Conflicting appointment  [x]  No transportation    []  Conflict with work  []  No reason given  []  Other:       Comments:    Car broke down on way to tx    Signature: Electronically signed by Maryam Nolasco PTA on 18 at 9:29 AM

## 2018-10-02 ENCOUNTER — HOSPITAL ENCOUNTER (OUTPATIENT)
Dept: PHYSICAL THERAPY | Age: 58
Setting detail: THERAPIES SERIES
Discharge: HOME | End: 2018-10-02
Payer: COMMERCIAL

## 2018-10-02 PROCEDURE — G0283 ELEC STIM OTHER THAN WOUND: HCPCS

## 2018-10-02 PROCEDURE — 97110 THERAPEUTIC EXERCISES: CPT

## 2018-10-02 PROCEDURE — 97140 MANUAL THERAPY 1/> REGIONS: CPT

## 2018-10-02 ASSESSMENT — PAIN SCALES - GENERAL: PAINLEVEL_OUTOF10: 1

## 2018-10-02 ASSESSMENT — PAIN DESCRIPTION - DESCRIPTORS: DESCRIPTORS: TIGHTNESS

## 2018-10-02 ASSESSMENT — PAIN DESCRIPTION - ORIENTATION: ORIENTATION: RIGHT

## 2018-10-02 ASSESSMENT — PAIN DESCRIPTION - LOCATION: LOCATION: ARM

## 2018-10-04 ENCOUNTER — HOSPITAL ENCOUNTER (OUTPATIENT)
Dept: PHYSICAL THERAPY | Age: 58
Setting detail: THERAPIES SERIES
Discharge: HOME | End: 2018-10-04
Payer: COMMERCIAL

## 2018-10-04 PROCEDURE — 97110 THERAPEUTIC EXERCISES: CPT

## 2018-10-04 ASSESSMENT — PAIN SCALES - GENERAL: PAINLEVEL_OUTOF10: 1

## 2018-10-04 ASSESSMENT — PAIN DESCRIPTION - DESCRIPTORS: DESCRIPTORS: TIGHTNESS

## 2018-10-04 ASSESSMENT — PAIN DESCRIPTION - ORIENTATION: ORIENTATION: RIGHT

## 2018-10-04 ASSESSMENT — PAIN DESCRIPTION - LOCATION: LOCATION: ARM

## 2018-10-09 ENCOUNTER — HOSPITAL ENCOUNTER (OUTPATIENT)
Dept: PHYSICAL THERAPY | Age: 58
Setting detail: THERAPIES SERIES
Discharge: HOME | End: 2018-10-09
Payer: COMMERCIAL

## 2018-10-09 PROCEDURE — G0283 ELEC STIM OTHER THAN WOUND: HCPCS

## 2018-10-09 PROCEDURE — 97110 THERAPEUTIC EXERCISES: CPT

## 2018-10-09 ASSESSMENT — PAIN DESCRIPTION - LOCATION: LOCATION: SHOULDER

## 2018-10-09 ASSESSMENT — PAIN DESCRIPTION - ORIENTATION: ORIENTATION: RIGHT

## 2018-10-09 ASSESSMENT — PAIN DESCRIPTION - DESCRIPTORS: DESCRIPTORS: SORE

## 2018-10-09 ASSESSMENT — PAIN SCALES - GENERAL: PAINLEVEL_OUTOF10: 2

## 2018-10-11 ENCOUNTER — APPOINTMENT (OUTPATIENT)
Dept: PHYSICAL THERAPY | Age: 58
End: 2018-10-11
Payer: COMMERCIAL

## 2018-10-16 ENCOUNTER — HOSPITAL ENCOUNTER (OUTPATIENT)
Dept: PHYSICAL THERAPY | Age: 58
Setting detail: THERAPIES SERIES
Discharge: HOME | End: 2018-10-16
Payer: COMMERCIAL

## 2018-10-23 ENCOUNTER — HOSPITAL ENCOUNTER (OUTPATIENT)
Dept: PHYSICAL THERAPY | Age: 58
Setting detail: THERAPIES SERIES
Discharge: HOME | End: 2018-10-23
Payer: COMMERCIAL

## 2018-10-30 ENCOUNTER — HOSPITAL ENCOUNTER (OUTPATIENT)
Dept: PHYSICAL THERAPY | Age: 58
Setting detail: THERAPIES SERIES
Discharge: HOME | End: 2018-10-30
Payer: COMMERCIAL

## 2018-11-13 ENCOUNTER — CLINICAL DOCUMENTATION (OUTPATIENT)
Dept: PHYSICAL THERAPY | Age: 58
End: 2018-11-13

## 2018-12-06 ENCOUNTER — CLINICAL DOCUMENTATION (OUTPATIENT)
Dept: PHYSICAL THERAPY | Age: 58
End: 2018-12-06

## 2024-03-14 ENCOUNTER — OFFICE VISIT (OUTPATIENT)
Dept: PRIMARY CARE | Facility: CLINIC | Age: 64
End: 2024-03-14
Payer: COMMERCIAL

## 2024-03-14 ENCOUNTER — LAB (OUTPATIENT)
Dept: LAB | Facility: LAB | Age: 64
End: 2024-03-14
Payer: COMMERCIAL

## 2024-03-14 VITALS
BODY MASS INDEX: 19.99 KG/M2 | TEMPERATURE: 98.2 F | HEIGHT: 69 IN | WEIGHT: 135 LBS | SYSTOLIC BLOOD PRESSURE: 149 MMHG | DIASTOLIC BLOOD PRESSURE: 97 MMHG | HEART RATE: 118 BPM | RESPIRATION RATE: 20 BRPM | OXYGEN SATURATION: 95 %

## 2024-03-14 DIAGNOSIS — Z83.3 FAMILY HISTORY OF DIABETES MELLITUS (DM): ICD-10-CM

## 2024-03-14 DIAGNOSIS — N40.0 BENIGN PROSTATIC HYPERPLASIA, UNSPECIFIED WHETHER LOWER URINARY TRACT SYMPTOMS PRESENT: ICD-10-CM

## 2024-03-14 DIAGNOSIS — I10 HYPERTENSION, UNSPECIFIED TYPE: ICD-10-CM

## 2024-03-14 DIAGNOSIS — E55.9 VITAMIN D DEFICIENCY: ICD-10-CM

## 2024-03-14 DIAGNOSIS — Z83.49 FAMILY HISTORY OF THYROID DISEASE: ICD-10-CM

## 2024-03-14 DIAGNOSIS — G47.00 INSOMNIA, UNSPECIFIED TYPE: ICD-10-CM

## 2024-03-14 DIAGNOSIS — E53.8 VITAMIN B12 DEFICIENCY: ICD-10-CM

## 2024-03-14 DIAGNOSIS — E78.5 HYPERLIPIDEMIA, UNSPECIFIED HYPERLIPIDEMIA TYPE: ICD-10-CM

## 2024-03-14 DIAGNOSIS — M48.9 CERVICAL SPINE DISEASE: ICD-10-CM

## 2024-03-14 DIAGNOSIS — Z00.00 ENCOUNTER FOR HEALTH MAINTENANCE EXAMINATION: Primary | ICD-10-CM

## 2024-03-14 LAB
25(OH)D3 SERPL-MCNC: 43 NG/ML (ref 30–100)
ALBUMIN SERPL BCP-MCNC: 4.7 G/DL (ref 3.4–5)
ALP SERPL-CCNC: 67 U/L (ref 33–136)
ALT SERPL W P-5'-P-CCNC: 16 U/L (ref 10–52)
ANION GAP SERPL CALC-SCNC: 18 MMOL/L (ref 10–20)
AST SERPL W P-5'-P-CCNC: 24 U/L (ref 9–39)
BILIRUB SERPL-MCNC: 0.6 MG/DL (ref 0–1.2)
BUN SERPL-MCNC: 8 MG/DL (ref 6–23)
CALCIUM SERPL-MCNC: 9.9 MG/DL (ref 8.6–10.3)
CHLORIDE SERPL-SCNC: 102 MMOL/L (ref 98–107)
CHOLEST SERPL-MCNC: 185 MG/DL (ref 0–199)
CHOLESTEROL/HDL RATIO: 2.1
CO2 SERPL-SCNC: 25 MMOL/L (ref 21–32)
CREAT SERPL-MCNC: 0.91 MG/DL (ref 0.5–1.3)
EGFRCR SERPLBLD CKD-EPI 2021: >90 ML/MIN/1.73M*2
ERYTHROCYTE [DISTWIDTH] IN BLOOD BY AUTOMATED COUNT: 12.9 % (ref 11.5–14.5)
EST. AVERAGE GLUCOSE BLD GHB EST-MCNC: 103 MG/DL
GLUCOSE SERPL-MCNC: 96 MG/DL (ref 74–99)
HBA1C MFR BLD: 5.2 %
HCT VFR BLD AUTO: 49.1 % (ref 41–52)
HDLC SERPL-MCNC: 86.1 MG/DL
HGB BLD-MCNC: 16.7 G/DL (ref 13.5–17.5)
LDLC SERPL CALC-MCNC: 84 MG/DL
MCH RBC QN AUTO: 35 PG (ref 26–34)
MCHC RBC AUTO-ENTMCNC: 34 G/DL (ref 32–36)
MCV RBC AUTO: 103 FL (ref 80–100)
NON HDL CHOLESTEROL: 99 MG/DL (ref 0–149)
NRBC BLD-RTO: 0 /100 WBCS (ref 0–0)
PLATELET # BLD AUTO: 361 X10*3/UL (ref 150–450)
POTASSIUM SERPL-SCNC: 4.3 MMOL/L (ref 3.5–5.3)
PROT SERPL-MCNC: 7.3 G/DL (ref 6.4–8.2)
PSA SERPL-MCNC: 0.5 NG/ML
RBC # BLD AUTO: 4.77 X10*6/UL (ref 4.5–5.9)
SODIUM SERPL-SCNC: 141 MMOL/L (ref 136–145)
TRIGL SERPL-MCNC: 77 MG/DL (ref 0–149)
TSH SERPL-ACNC: 3.54 MIU/L (ref 0.44–3.98)
VIT B12 SERPL-MCNC: 621 PG/ML (ref 211–911)
VLDL: 15 MG/DL (ref 0–40)
WBC # BLD AUTO: 13.2 X10*3/UL (ref 4.4–11.3)

## 2024-03-14 PROCEDURE — 99396 PREV VISIT EST AGE 40-64: CPT | Performed by: FAMILY MEDICINE

## 2024-03-14 PROCEDURE — 85027 COMPLETE CBC AUTOMATED: CPT

## 2024-03-14 PROCEDURE — 84443 ASSAY THYROID STIM HORMONE: CPT

## 2024-03-14 PROCEDURE — 36415 COLL VENOUS BLD VENIPUNCTURE: CPT

## 2024-03-14 PROCEDURE — 82306 VITAMIN D 25 HYDROXY: CPT

## 2024-03-14 PROCEDURE — 82607 VITAMIN B-12: CPT

## 2024-03-14 PROCEDURE — 3077F SYST BP >= 140 MM HG: CPT | Performed by: FAMILY MEDICINE

## 2024-03-14 PROCEDURE — 80061 LIPID PANEL: CPT

## 2024-03-14 PROCEDURE — 84153 ASSAY OF PSA TOTAL: CPT

## 2024-03-14 PROCEDURE — 80053 COMPREHEN METABOLIC PANEL: CPT

## 2024-03-14 PROCEDURE — 3080F DIAST BP >= 90 MM HG: CPT | Performed by: FAMILY MEDICINE

## 2024-03-14 PROCEDURE — 83036 HEMOGLOBIN GLYCOSYLATED A1C: CPT

## 2024-03-14 RX ORDER — AMLODIPINE BESYLATE 5 MG/1
1 TABLET ORAL DAILY
COMMUNITY
Start: 2019-06-02 | End: 2024-03-14 | Stop reason: SDUPTHER

## 2024-03-14 RX ORDER — TRAZODONE HYDROCHLORIDE 50 MG/1
50 TABLET ORAL NIGHTLY PRN
Qty: 30 TABLET | Refills: 0 | Status: SHIPPED | OUTPATIENT
Start: 2024-03-14 | End: 2025-03-14

## 2024-03-14 RX ORDER — GABAPENTIN 300 MG/1
300 CAPSULE ORAL 2 TIMES DAILY
Qty: 180 CAPSULE | Refills: 1 | Status: SHIPPED | OUTPATIENT
Start: 2024-03-14 | End: 2024-09-10

## 2024-03-14 RX ORDER — GABAPENTIN 300 MG/1
1 CAPSULE ORAL 2 TIMES DAILY
COMMUNITY
Start: 2019-06-18 | End: 2024-03-14 | Stop reason: SDUPTHER

## 2024-03-14 RX ORDER — AMLODIPINE BESYLATE 5 MG/1
5 TABLET ORAL DAILY
Qty: 90 TABLET | Refills: 1 | Status: SHIPPED | OUTPATIENT
Start: 2024-03-14 | End: 2024-09-10

## 2024-03-14 NOTE — PROGRESS NOTES
"Subjective   Patient ID: Uli De Leon is a 63 y.o. male who presents for Follow-up (Medication fu. No questions, concerns, or complaints. /).    HPI   Patient presents today for 1 year checkup and refill of meds. He currently is without complaints other than having a hard time sleeping. He states that he is taking his medicines as directed and is not having any side effects from them.    Patient states he has a blood pressure cuff at home that apparently gives him alerts and set of readings such as green, yellow and red.  He states that it also has pulse rate on it that frequently is around 100 but not usually over 100.    The sleep issue has been going on for a while.  He states he tried some Advil PM but it really did not help.  It also made him groggy the next day.  He states he usually tries to go to bed around 8:30-9 PM in the evening and winds up waking up around 1 AM to urinate and then he cannot fall back to sleep.  He winds up tossing and turning to around 8 AM when he finally gets up.  He then feels sleepy and tired the next day.        2/28/2023  Patient comes in today for checkup and follow-up on his medications. Patient states he is feeling good in fact this is the best he has felt in some time. He is taking his medicines as directed and is not experiencing any side effects from them. He did forget to take his blood pressure medicine this morning.     6/29/2022  Patient comes in today for Medicare wellness exam. Since he was here in January he did stop the sertraline medication because he did not like the way it was making him feel. He does not want to try a different medication, he states he is just \"dealing with it\" on his own. He admits to drinking a lot of beer and denies drug use.    1/13/2022  Patient presents today for 6-month checkup and refill of meds. He states that he is taking his medicines as directed and is not having any side effects from them. He is having a difficult time. We just " "past the 1 year anniversary of his son's death and he has been taking it very hard. He cannot sleep and he cannot concentrate. 1 bright spot is that he has a daughter who just told him that she is pregnant.    6/23/2021  Patient presents today for 6-month checkup and refill of meds. He currently is without complaints. He states that he is taking his medicines as directed and is not having any side effects from them. He lost his son back in January to an OD on cocaine after battling the cocaine addiction for 7 years. He states he is doing okay now and does not want any medication. He was having a hard time back in January but did not call here.    7/15/2020  Patient comes in today as a new patient to the practice. He is transitioning into care as a former patient of Dr. Be Fry. Saw [facility] Dr. will. He claims to be in good health. He is in need of refills of his medication as well as lab work. 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    PSH: Left carpal tunnel surgery 2018. Left ulnar nerve surgery.    Social history: First marriage 12 years. Current second marriage 17 years. 2 children from the first marriage, a daughter 21 years old and son 27 years old. Patient has grandson 12 years old whom he is raising and 2 stepchildren.  Patient smoking 1 pack per day since 18 years old. Drinks beer daily 2-6 cans. Uses rare marijuana.    Family history: Father bladder cancer, alive 80 years old. Mom alive and well 78 years old. Patient has one sister and 2 brothers, patient is the oldest.    6/28/2019   The patient is a 58 year old male who presents today for an annual exam. His diet includes balanced healthy meals, dairy products, vegetables, meats, fast food, soda / pop, water, coffee and vitamins. He does not exercise. The patient is sexually active. PHQ 2 DEPRESSION / GENERAL SCREENING: He reports: often feeling down or depressed, having little interest in things, poor sleep, low energy and specific complaints (issues " "with right arm numbiness. seeing ortho . blood pressure is elevated. c/o b/l leg and feet cramping and pain.), but NOT conflict or violence in the family or a mole that changed in size/shape.      Review of Systems   All other systems reviewed and are negative.      Objective   BP (!) 149/97   Pulse (!) 118   Temp 36.8 °C (98.2 °F)   Resp 20   Ht 1.753 m (5' 9\")   Wt 61.2 kg (135 lb)   SpO2 95%   BMI 19.94 kg/m²     Physical Exam  Vitals reviewed.   Constitutional:       Appearance: He is well-developed.   HENT:      Head: Normocephalic and atraumatic.      Right Ear: Tympanic membrane, ear canal and external ear normal.      Left Ear: Tympanic membrane, ear canal and external ear normal.      Nose: Nose normal.      Mouth/Throat:      Mouth: Mucous membranes are moist.      Pharynx: Oropharynx is clear.   Eyes:      Extraocular Movements: Extraocular movements intact.      Conjunctiva/sclera: Conjunctivae normal.      Pupils: Pupils are equal, round, and reactive to light.   Cardiovascular:      Rate and Rhythm: Normal rate and regular rhythm.      Heart sounds: Normal heart sounds. No murmur heard.  Pulmonary:      Effort: Pulmonary effort is normal.      Breath sounds: Normal breath sounds. No wheezing.   Abdominal:      General: Abdomen is flat. Bowel sounds are normal.      Palpations: Abdomen is soft.   Musculoskeletal:         General: Normal range of motion.   Skin:     General: Skin is warm and dry.   Neurological:      General: No focal deficit present.      Mental Status: He is alert and oriented to person, place, and time. Mental status is at baseline.   Psychiatric:         Mood and Affect: Mood normal.         Behavior: Behavior normal.         Thought Content: Thought content normal.         Judgment: Judgment normal.       Assessment/Plan   Problem List Items Addressed This Visit    None  Visit Diagnoses         Codes    Encounter for health maintenance examination    -  Primary Z00.00    " Vitamin D deficiency     E55.9    Relevant Orders    Vitamin D 25-Hydroxy,Total (for eval of Vitamin D levels)    Vitamin B12 deficiency     E53.8    Relevant Orders    CBC    Vitamin B12    Hyperlipidemia, unspecified hyperlipidemia type     E78.5    Relevant Orders    Lipid Panel    Family history of diabetes mellitus (DM)     Z83.3    Relevant Orders    Hemoglobin A1C    Family history of thyroid disease     Z83.49    Relevant Orders    TSH with reflex to Free T4 if abnormal    Hypertension, unspecified type     I10    Relevant Medications    amLODIPine (Norvasc) 5 mg tablet    Other Relevant Orders    Comprehensive Metabolic Panel    Cervical spine disease     M48.9    Relevant Medications    gabapentin (Neurontin) 300 mg capsule    Benign prostatic hyperplasia, unspecified whether lower urinary tract symptoms present     N40.0    Relevant Orders    Prostate Specific Antigen    Insomnia, unspecified type     G47.00    Relevant Medications    traZODone (Desyrel) 50 mg tablet        Patient refuses colonoscopy.  Will contact patient with lab results when available.  Take new sleep medication as directed.  Continue other medications as directed.  RTC in one month for recheck, sooner should problems arise.  Medication list reconciled.  Thank you for visiting today!      Professional services: Some of this note was completed using Dragon voice technology and sometimes the software misinterprets words. This may include unintended errors with respect to translation of words, typographical errors or grammar errors which may not have been identified prior to finalization of the chart note. Please take this into account when reading the note. Thank you.

## 2024-03-15 ENCOUNTER — TELEPHONE (OUTPATIENT)
Dept: PRIMARY CARE | Facility: CLINIC | Age: 64
End: 2024-03-15
Payer: COMMERCIAL

## 2024-03-19 ENCOUNTER — TELEPHONE (OUTPATIENT)
Dept: PRIMARY CARE | Facility: CLINIC | Age: 64
End: 2024-03-19
Payer: COMMERCIAL

## 2024-03-19 NOTE — TELEPHONE ENCOUNTER
Patient called to give the doctor an update since starting the Trazodone.  Patient states that he is only taking half, but that it is working great.  Patient can be reached at [phone].      Thank You